# Patient Record
Sex: MALE
[De-identification: names, ages, dates, MRNs, and addresses within clinical notes are randomized per-mention and may not be internally consistent; named-entity substitution may affect disease eponyms.]

---

## 2019-11-17 ENCOUNTER — RESULT REVIEW (OUTPATIENT)
Age: 59
End: 2019-11-17

## 2019-11-18 ENCOUNTER — APPOINTMENT (OUTPATIENT)
Dept: DERMATOLOGY | Facility: CLINIC | Age: 59
End: 2019-11-18
Payer: COMMERCIAL

## 2019-11-18 PROCEDURE — 17281 DSTR MAL LS F/E/E/N/L/M .6-1: CPT

## 2019-11-18 PROCEDURE — 99202 OFFICE O/P NEW SF 15 MIN: CPT | Mod: 25

## 2019-11-19 PROBLEM — Z00.00 ENCOUNTER FOR PREVENTIVE HEALTH EXAMINATION: Status: ACTIVE | Noted: 2019-11-19

## 2019-11-21 ENCOUNTER — APPOINTMENT (OUTPATIENT)
Dept: SURGICAL ONCOLOGY | Facility: CLINIC | Age: 59
End: 2019-11-21
Payer: COMMERCIAL

## 2019-11-21 VITALS
SYSTOLIC BLOOD PRESSURE: 158 MMHG | WEIGHT: 230 LBS | RESPIRATION RATE: 14 BRPM | DIASTOLIC BLOOD PRESSURE: 97 MMHG | BODY MASS INDEX: 34.86 KG/M2 | HEIGHT: 68 IN | TEMPERATURE: 98.3 F | OXYGEN SATURATION: 95 % | HEART RATE: 102 BPM

## 2019-11-21 DIAGNOSIS — Z80.42 FAMILY HISTORY OF MALIGNANT NEOPLASM OF PROSTATE: ICD-10-CM

## 2019-11-21 DIAGNOSIS — E11.9 TYPE 2 DIABETES MELLITUS W/OUT COMPLICATIONS: ICD-10-CM

## 2019-11-21 DIAGNOSIS — E78.5 HYPERLIPIDEMIA, UNSPECIFIED: ICD-10-CM

## 2019-11-21 DIAGNOSIS — Z79.4 TYPE 2 DIABETES MELLITUS W/OUT COMPLICATIONS: ICD-10-CM

## 2019-11-21 DIAGNOSIS — I10 ESSENTIAL (PRIMARY) HYPERTENSION: ICD-10-CM

## 2019-11-21 DIAGNOSIS — Z87.448 PERSONAL HISTORY OF OTHER DISEASES OF URINARY SYSTEM: ICD-10-CM

## 2019-11-21 DIAGNOSIS — Z87.442 PERSONAL HISTORY OF URINARY CALCULI: ICD-10-CM

## 2019-11-21 DIAGNOSIS — Z78.9 OTHER SPECIFIED HEALTH STATUS: ICD-10-CM

## 2019-11-21 DIAGNOSIS — Z80.3 FAMILY HISTORY OF MALIGNANT NEOPLASM OF BREAST: ICD-10-CM

## 2019-11-21 DIAGNOSIS — Z80.8 FAMILY HISTORY OF MALIGNANT NEOPLASM OF OTHER ORGANS OR SYSTEMS: ICD-10-CM

## 2019-11-21 PROCEDURE — 99204 OFFICE O/P NEW MOD 45 MIN: CPT

## 2019-11-21 RX ORDER — AMLODIPINE BESYLATE 5 MG/1
5 TABLET ORAL
Refills: 0 | Status: ACTIVE | COMMUNITY

## 2019-11-21 RX ORDER — INSULIN DETEMIR 100 [IU]/ML
100 INJECTION, SOLUTION SUBCUTANEOUS
Refills: 0 | Status: ACTIVE | COMMUNITY

## 2019-11-21 RX ORDER — ESCITALOPRAM OXALATE 10 MG/1
10 TABLET, FILM COATED ORAL
Refills: 0 | Status: ACTIVE | COMMUNITY

## 2019-11-21 RX ORDER — ATORVASTATIN CALCIUM 80 MG/1
TABLET, FILM COATED ORAL
Refills: 0 | Status: ACTIVE | COMMUNITY

## 2019-11-21 RX ORDER — LOSARTAN POTASSIUM 50 MG/1
50 TABLET, FILM COATED ORAL
Refills: 0 | Status: ACTIVE | COMMUNITY

## 2019-11-21 RX ORDER — ALLOPURINOL 300 MG/1
300 TABLET ORAL
Refills: 0 | Status: ACTIVE | COMMUNITY

## 2019-11-21 RX ORDER — FOLIC ACID 1 MG/1
1 TABLET ORAL
Refills: 0 | Status: ACTIVE | COMMUNITY

## 2019-11-27 ENCOUNTER — OUTPATIENT (OUTPATIENT)
Dept: OUTPATIENT SERVICES | Facility: HOSPITAL | Age: 59
LOS: 1 days | End: 2019-11-27
Payer: MEDICARE

## 2019-11-27 DIAGNOSIS — C43.9 MALIGNANT MELANOMA OF SKIN, UNSPECIFIED: ICD-10-CM

## 2019-11-29 ENCOUNTER — RESULT REVIEW (OUTPATIENT)
Age: 59
End: 2019-11-29

## 2019-11-29 PROCEDURE — 88321 CONSLTJ&REPRT SLD PREP ELSWR: CPT

## 2019-12-02 NOTE — CONSULT LETTER
[Dear  ___] : Dear  [unfilled], [Consult Letter:] : I had the pleasure of evaluating your patient, [unfilled]. [Please see my note below.] : Please see my note below. [Consult Closing:] : Thank you very much for allowing me to participate in the care of this patient.  If you have any questions, please do not hesitate to contact me. [Sincerely,] : Sincerely, [FreeTextEntry3] : Aidan Vera MD, MPH, FACS\par Surgical Oncology\par [facility] [place]\par Associate Professor of Surgery\par Department of General Surgery\par [employer] at [facility]

## 2019-12-02 NOTE — PHYSICAL EXAM
[Normal] : supple, no neck mass and thyroid not enlarged [Normal Neck Lymph Nodes] : normal neck lymph nodes  [Normal Supraclavicular Lymph Nodes] : normal supraclavicular lymph nodes [Normal Axillary Lymph Nodes] : normal axillary lymph nodes [Normal] : oriented to person, place and time, with appropriate affect [de-identified] : left posterior shoulder dark brown/black macule with healing shave biopsy site ; lower back atypical nevus

## 2019-12-02 NOTE — HISTORY OF PRESENT ILLNESS
[de-identified] : Mr. JOE RAMIREZ  is a 59 year  old male  presenting for an initial consultation for newly diagnosed left upper shoulder melanoma, referred by Dr. Suresh. \par \par The patient states he developed an enlarging dark brown patch on his left upper shoulder over the past year and he sought care with dermatology.  He also had a left forehead BCC excised a few weeks ago.   \par \par Left upper shoulder shave biopsy 11/8/19- MELANOMA, approximately 0.4 mm in thickness, with focal dermal regression, mitotic index is 0/mm2, no ulceration. Tumor stage: pT1a\par \par Mr. RAMIREZ reports a longstanding history of sun exposure without the use of sunblock. History of sunburns. He also has a concerning mole on his lower back which has not yet been biopsied. \par \par PMH notable for HTN, HLD, Kidney stones resulting in obstruction and kidney failure s/p lithotripsy (last episode in 2014), IDDM, Depression/anxiety.  \par \par Family history includes mother with breast cancer, father with prostate cancer and brother with a melanoma.

## 2019-12-02 NOTE — REVIEW OF SYSTEMS
[Patient Intake Form Reviewed] : Patient intake form was reviewed [Negative] : Heme/Lymph [de-identified] : see HPI

## 2019-12-02 NOTE — ASSESSMENT
[FreeTextEntry1] : IMP:\par -Left upper shoulder shave biopsy 11/8/19- MELANOMA, approximately 0.4 mm in thickness, with focal dermal regression, mitotic index is 0/mm2, no ulceration. Tumor stage: pT1a\par -Lower back atypical nevus\par \par PLAN:\par Wide local excision of left upper shoulder T1 melanoma and lower back atypical nevus. We discussed the risks, benefits, and alternatives of the procedure with the patient, he expressed understanding and agree to proceed.

## 2019-12-11 ENCOUNTER — OUTPATIENT (OUTPATIENT)
Dept: OUTPATIENT SERVICES | Facility: HOSPITAL | Age: 59
LOS: 1 days | End: 2019-12-11
Payer: COMMERCIAL

## 2019-12-11 VITALS
TEMPERATURE: 98 F | SYSTOLIC BLOOD PRESSURE: 150 MMHG | DIASTOLIC BLOOD PRESSURE: 98 MMHG | RESPIRATION RATE: 18 BRPM | HEIGHT: 67 IN | WEIGHT: 233.69 LBS | HEART RATE: 98 BPM

## 2019-12-11 DIAGNOSIS — Z29.9 ENCOUNTER FOR PROPHYLACTIC MEASURES, UNSPECIFIED: ICD-10-CM

## 2019-12-11 DIAGNOSIS — Z98.890 OTHER SPECIFIED POSTPROCEDURAL STATES: Chronic | ICD-10-CM

## 2019-12-11 DIAGNOSIS — Z01.818 ENCOUNTER FOR OTHER PREPROCEDURAL EXAMINATION: ICD-10-CM

## 2019-12-11 DIAGNOSIS — C43.62 MALIGNANT MELANOMA OF LEFT UPPER LIMB, INCLUDING SHOULDER: ICD-10-CM

## 2019-12-11 DIAGNOSIS — R94.31 ABNORMAL ELECTROCARDIOGRAM [ECG] [EKG]: ICD-10-CM

## 2019-12-11 DIAGNOSIS — E11.9 TYPE 2 DIABETES MELLITUS WITHOUT COMPLICATIONS: ICD-10-CM

## 2019-12-11 LAB
ANION GAP SERPL CALC-SCNC: 14 MMOL/L — SIGNIFICANT CHANGE UP (ref 5–17)
BASOPHILS # BLD AUTO: 0.05 K/UL — SIGNIFICANT CHANGE UP (ref 0–0.2)
BASOPHILS NFR BLD AUTO: 0.5 % — SIGNIFICANT CHANGE UP (ref 0–2)
BLD GP AB SCN SERPL QL: SIGNIFICANT CHANGE UP
BUN SERPL-MCNC: 27 MG/DL — HIGH (ref 8–20)
CALCIUM SERPL-MCNC: 10.3 MG/DL — HIGH (ref 8.6–10.2)
CHLORIDE SERPL-SCNC: 93 MMOL/L — LOW (ref 98–107)
CO2 SERPL-SCNC: 27 MMOL/L — SIGNIFICANT CHANGE UP (ref 22–29)
CREAT SERPL-MCNC: 1.13 MG/DL — SIGNIFICANT CHANGE UP (ref 0.5–1.3)
EOSINOPHIL # BLD AUTO: 0.16 K/UL — SIGNIFICANT CHANGE UP (ref 0–0.5)
EOSINOPHIL NFR BLD AUTO: 1.7 % — SIGNIFICANT CHANGE UP (ref 0–6)
GLUCOSE SERPL-MCNC: 452 MG/DL — HIGH (ref 70–115)
HBA1C BLD-MCNC: 12.5 % — HIGH (ref 4–5.6)
HCT VFR BLD CALC: 46.8 % — SIGNIFICANT CHANGE UP (ref 39–50)
HGB BLD-MCNC: 16.2 G/DL — SIGNIFICANT CHANGE UP (ref 13–17)
IMM GRANULOCYTES NFR BLD AUTO: 0.6 % — SIGNIFICANT CHANGE UP (ref 0–1.5)
LYMPHOCYTES # BLD AUTO: 2.13 K/UL — SIGNIFICANT CHANGE UP (ref 1–3.3)
LYMPHOCYTES # BLD AUTO: 22.3 % — SIGNIFICANT CHANGE UP (ref 13–44)
MCHC RBC-ENTMCNC: 29.6 PG — SIGNIFICANT CHANGE UP (ref 27–34)
MCHC RBC-ENTMCNC: 34.6 GM/DL — SIGNIFICANT CHANGE UP (ref 32–36)
MCV RBC AUTO: 85.4 FL — SIGNIFICANT CHANGE UP (ref 80–100)
MONOCYTES # BLD AUTO: 0.58 K/UL — SIGNIFICANT CHANGE UP (ref 0–0.9)
MONOCYTES NFR BLD AUTO: 6.1 % — SIGNIFICANT CHANGE UP (ref 2–14)
NEUTROPHILS # BLD AUTO: 6.57 K/UL — SIGNIFICANT CHANGE UP (ref 1.8–7.4)
NEUTROPHILS NFR BLD AUTO: 68.8 % — SIGNIFICANT CHANGE UP (ref 43–77)
PLATELET # BLD AUTO: 237 K/UL — SIGNIFICANT CHANGE UP (ref 150–400)
POTASSIUM SERPL-MCNC: 4.8 MMOL/L — SIGNIFICANT CHANGE UP (ref 3.5–5.3)
POTASSIUM SERPL-SCNC: 4.8 MMOL/L — SIGNIFICANT CHANGE UP (ref 3.5–5.3)
RBC # BLD: 5.48 M/UL — SIGNIFICANT CHANGE UP (ref 4.2–5.8)
RBC # FLD: 13 % — SIGNIFICANT CHANGE UP (ref 10.3–14.5)
SODIUM SERPL-SCNC: 134 MMOL/L — LOW (ref 135–145)
WBC # BLD: 9.55 K/UL — SIGNIFICANT CHANGE UP (ref 3.8–10.5)
WBC # FLD AUTO: 9.55 K/UL — SIGNIFICANT CHANGE UP (ref 3.8–10.5)

## 2019-12-11 PROCEDURE — 93010 ELECTROCARDIOGRAM REPORT: CPT

## 2019-12-11 PROCEDURE — 93005 ELECTROCARDIOGRAM TRACING: CPT

## 2019-12-11 PROCEDURE — G0463: CPT

## 2019-12-11 NOTE — H&P PST ADULT - LAB RESULTS AND INTERPRETATION
Lab reviewed, Copies faxed to PCP and Dr. Vera office. Spoke to Angelica in PCP and  Baudilio in Dr Vera office regarding abnormal results.

## 2019-12-11 NOTE — H&P PST ADULT - NSICDXPASTMEDICALHX_GEN_ALL_CORE_FT
PAST MEDICAL HISTORY:  Diabetes mellitus     Hyperlipidemia     Hypertension     Malignant melanoma of left upper extremity including shoulder     ILIA on CPAP

## 2019-12-11 NOTE — H&P PST ADULT - EKG AND INTERPRETATION
normal sinus 97 bpm, right BBB and left anterior fascicular block, no acute change. EKG pending official reading

## 2019-12-11 NOTE — H&P PST ADULT - NSICDXFAMILYHX_GEN_ALL_CORE_FT
FAMILY HISTORY:  Father  Still living? No  FH: lung cancer, Age at diagnosis: Age Unknown    Mother  Still living? Yes, Estimated age: Age Unknown  FHx: breast cancer, Age at diagnosis: Age Unknown

## 2019-12-11 NOTE — H&P PST ADULT - NSICDXPROBLEM_GEN_ALL_CORE_FT
PROBLEM DIAGNOSES  Problem: Malignant melanoma of skin of upper limb, including shoulder, left  Assessment and Plan: wide excision left upper back malanoma and lower mid back atypical     Problem: Type 2 diabetes mellitus  Assessment and Plan: f/u with PCP    Problem: Abnormal EKG  Assessment and Plan: f/u with cardiologist     Problem: Need for prophylactic measure  Assessment and Plan: high risk surgical team to determine prophylactic intervention

## 2019-12-11 NOTE — H&P PST ADULT - HISTORY OF PRESENT ILLNESS
60 y/o male DM type 2, HTN, HLD, ILIA( on CPAP Machine), depression and anxiety seen today pre-op for wide excision left upper back melanoma and lower mid back atypical mole for malignant melanoma of left upper back and shoulder 60 y/o male DM type 2, HTN, HLD, ILIA( on CPAP Machine), depression and anxiety seen today pre-op for wide excision left upper back melanoma and lower mid back atypical mole for malignant melanoma of left upper back and shoulder, Pt seen for a scheduled surgery with Dr. Vera.

## 2019-12-11 NOTE — H&P PST ADULT - ATTENDING COMMENTS
Risks, benefits, and alternatives discussed with the patient he expressed understanding and agrees to proceed with wide local excision of left upper back melanoma and lower mid back atypical mole.

## 2019-12-11 NOTE — H&P PST ADULT - ASSESSMENT
58 y/o male DM type 2, HTN, HLD, ILIA( on CPAP Machine), depression and anxiety seen today pre-op for wide excision left upper back melanoma and lower mid back atypical mole for malignant melanoma of left upper back and shoulder. Surgery protocol reviewed with pt today. Pt to follow-up with PCP and cardiologist for clearances. Pt accompanied to this visit by his son   CAPRINI VTE 2.0 SCORE [CLOT updated 2019]    AGE RELATED RISK FACTORS                                                       MOBILITY RELATED FACTORS  [ x] Age 41-60 years                                            (1 Point)                    [ ] Bed rest                                                        (1 Point)  [ ] Age: 61-74 years                                           (2 Points)                  [ ] Plaster cast                                                   (2 Points)  [ ] Age= 75 years                                              (3 Points)                    [ ] Bed bound for more than 72 hours                 (2 Points)    DISEASE RELATED RISK FACTORS                                               GENDER SPECIFIC FACTORS  [ ] Edema in the lower extremities                       (1 Point)              [ ] Pregnancy                                                     (1 Point)  [ ] Varicose veins                                               (1 Point)                     [ ] Post-partum < 6 weeks                                   (1 Point)             [ x] BMI > 25 Kg/m2                                            (1 Point)                     [ ] Hormonal therapy  or oral contraception          (1 Point)                 [ ] Sepsis (in the previous month)                        (1 Point)               [ ] History of pregnancy complications                 (1 point)  [ ] Pneumonia or serious lung disease                                               [ ] Unexplained or recurrent                     (1 Point)           (in the previous month)                               (1 Point)  [ ] Abnormal pulmonary function test                     (1 Point)                 SURGERY RELATED RISK FACTORS  [ ] Acute myocardial infarction                              (1 Point)               [ ]  Section                                             (1 Point)  [ ] Congestive heart failure (in the previous month)  (1 Point)      [ ] Minor surgery                                                  (1 Point)   [ ] Inflammatory bowel disease                             (1 Point)               [ ] Arthroscopic surgery                                        (2 Points)  [ ] Central venous access                                      (2 Points)                [x ] General surgery lasting more than 45 minutes (2 points)  [x ] Malignancy- Present or previous                   (2 Points)                [ ] Elective arthroplasty                                         (5 points)    [ ] Stroke (in the previous month)                          (5 Points)                                                                                                                                                           HEMATOLOGY RELATED FACTORS                                                 TRAUMA RELATED RISK FACTORS  [ ] Prior episodes of VTE                                     (3 Points)                [ ] Fracture of the hip, pelvis, or leg                       (5 Points)  [ ] Positive family history for VTE                         (3 Points)             [ ] Acute spinal cord injury (in the previous month)  (5 Points)  [ ] Prothrombin 79276 A                                     (3 Points)               [ ] Paralysis  (less than 1 month)                             (5 Points)  [ ] Factor V Leiden                                             (3 Points)                  [ ] Multiple Trauma within 1 month                        (5 Points)  [ ] Lupus anticoagulants                                     (3 Points)                                                           [ ] Anticardiolipin antibodies                               (3 Points)                                                       [ ] High homocysteine in the blood                      (3 Points)                                             [ ] Other congenital or acquired thrombophilia      (3 Points)                                                [ ] Heparin induced thrombocytopenia                  (3 Points)                                     Total Score [     6     ]  OPIOID RISK TOOL    MOISÉS EACH BOX THAT APPLIES AND ADD TOTALS AT THE END    FAMILY HISTORY OF SUBSTANCE ABUSE                 FEMALE         MALE                                                Alcohol                             [  ]1 pt          [  ]3pts                                               Illegal Durgs                     [  ]2 pts        [  ]3pts                                               Rx Drugs                           [  ]4 pts        [  ]4 pts    PERSONAL HISTORY OF SUBSTANCE ABUSE                                                                                          Alcohol                             [  ]3 pts       [  ]3 pts                                               Illegal Drugs                     [  ]4 pts        [  ]4 pts                                               Rx Drugs                           [  ]5 pts        [  ]5 pts    AGE BETWEEN 16-45 YEARS                                      [  ]1 pt         [  ]1 pt    HISTORY OF PREADOLESCENT   SEXUAL ABUSE                                                             [  ]3 pts        [  ]0pts    PSYCHOLOGICAL DISEASE                     ADD, OCD, Bipolar, Schizophrenia        [  ]2 pts         [  ]2 pts                      Depression                                               [x  ]1 pt           [  ]1 pt           SCORING TOTAL   (add numbers and type here)              (*1**)                                     A score of 3 or lower indicated LOW risk for future opioid abuse  A score of 4 to 7 indicated moderate risk for future opioid abuse  A score of 8 or higher indicates a high risk for opioid abuse

## 2019-12-15 ENCOUNTER — TRANSCRIPTION ENCOUNTER (OUTPATIENT)
Age: 59
End: 2019-12-15

## 2019-12-16 ENCOUNTER — OUTPATIENT (OUTPATIENT)
Dept: OUTPATIENT SERVICES | Facility: HOSPITAL | Age: 59
LOS: 1 days | End: 2019-12-16
Payer: COMMERCIAL

## 2019-12-16 ENCOUNTER — RESULT REVIEW (OUTPATIENT)
Age: 59
End: 2019-12-16

## 2019-12-16 ENCOUNTER — APPOINTMENT (OUTPATIENT)
Dept: SURGICAL ONCOLOGY | Facility: HOSPITAL | Age: 59
End: 2019-12-16

## 2019-12-16 VITALS
RESPIRATION RATE: 16 BRPM | DIASTOLIC BLOOD PRESSURE: 84 MMHG | HEIGHT: 67 IN | TEMPERATURE: 98 F | OXYGEN SATURATION: 97 % | WEIGHT: 233.69 LBS | HEART RATE: 82 BPM | SYSTOLIC BLOOD PRESSURE: 151 MMHG

## 2019-12-16 VITALS
HEART RATE: 85 BPM | DIASTOLIC BLOOD PRESSURE: 68 MMHG | RESPIRATION RATE: 15 BRPM | TEMPERATURE: 98 F | OXYGEN SATURATION: 100 % | SYSTOLIC BLOOD PRESSURE: 111 MMHG

## 2019-12-16 DIAGNOSIS — C43.62 MALIGNANT MELANOMA OF LEFT UPPER LIMB, INCLUDING SHOULDER: ICD-10-CM

## 2019-12-16 DIAGNOSIS — Z98.890 OTHER SPECIFIED POSTPROCEDURAL STATES: Chronic | ICD-10-CM

## 2019-12-16 DIAGNOSIS — D22.5 MELANOCYTIC NEVI OF TRUNK: ICD-10-CM

## 2019-12-16 PROBLEM — I10 ESSENTIAL (PRIMARY) HYPERTENSION: Chronic | Status: ACTIVE | Noted: 2019-12-11

## 2019-12-16 PROBLEM — G47.33 OBSTRUCTIVE SLEEP APNEA (ADULT) (PEDIATRIC): Chronic | Status: ACTIVE | Noted: 2019-12-11

## 2019-12-16 PROBLEM — E78.5 HYPERLIPIDEMIA, UNSPECIFIED: Chronic | Status: ACTIVE | Noted: 2019-12-11

## 2019-12-16 PROBLEM — E11.9 TYPE 2 DIABETES MELLITUS WITHOUT COMPLICATIONS: Chronic | Status: ACTIVE | Noted: 2019-12-11

## 2019-12-16 LAB
GLUCOSE BLDC GLUCOMTR-MCNC: 254 MG/DL — HIGH (ref 70–99)
GLUCOSE BLDC GLUCOMTR-MCNC: 259 MG/DL — HIGH (ref 70–99)
GLUCOSE BLDC GLUCOMTR-MCNC: 326 MG/DL — HIGH (ref 70–99)

## 2019-12-16 PROCEDURE — 13102 CMPLX RPR TRUNK ADDL 5CM/<: CPT

## 2019-12-16 PROCEDURE — 82962 GLUCOSE BLOOD TEST: CPT

## 2019-12-16 PROCEDURE — 11403 EXC TR-EXT B9+MARG 2.1-3CM: CPT

## 2019-12-16 PROCEDURE — 13101 CMPLX RPR TRUNK 2.6-7.5 CM: CPT

## 2019-12-16 PROCEDURE — 11604 EXC TR-EXT MAL+MARG 3.1-4 CM: CPT

## 2019-12-16 PROCEDURE — 88305 TISSUE EXAM BY PATHOLOGIST: CPT

## 2019-12-16 PROCEDURE — 88305 TISSUE EXAM BY PATHOLOGIST: CPT | Mod: 26

## 2019-12-16 RX ORDER — ESCITALOPRAM OXALATE 10 MG/1
1 TABLET, FILM COATED ORAL
Qty: 0 | Refills: 0 | DISCHARGE

## 2019-12-16 RX ORDER — FOLIC ACID 0.8 MG
1 TABLET ORAL
Qty: 0 | Refills: 0 | DISCHARGE

## 2019-12-16 RX ORDER — ALLOPURINOL 300 MG
1 TABLET ORAL
Qty: 0 | Refills: 0 | DISCHARGE

## 2019-12-16 RX ORDER — LOSARTAN POTASSIUM 100 MG/1
1 TABLET, FILM COATED ORAL
Qty: 0 | Refills: 0 | DISCHARGE

## 2019-12-16 RX ORDER — ATORVASTATIN CALCIUM 80 MG/1
1 TABLET, FILM COATED ORAL
Qty: 0 | Refills: 0 | DISCHARGE

## 2019-12-16 RX ORDER — SODIUM CHLORIDE 9 MG/ML
1000 INJECTION INTRAMUSCULAR; INTRAVENOUS; SUBCUTANEOUS
Refills: 0 | Status: DISCONTINUED | OUTPATIENT
Start: 2019-12-16 | End: 2019-12-16

## 2019-12-16 RX ORDER — INSULIN DETEMIR 100/ML (3)
0 INSULIN PEN (ML) SUBCUTANEOUS
Qty: 0 | Refills: 0 | DISCHARGE

## 2019-12-16 RX ORDER — ONDANSETRON 8 MG/1
4 TABLET, FILM COATED ORAL ONCE
Refills: 0 | Status: DISCONTINUED | OUTPATIENT
Start: 2019-12-16 | End: 2019-12-16

## 2019-12-16 RX ORDER — INSULIN HUMAN 100 [IU]/ML
10 INJECTION, SOLUTION SUBCUTANEOUS ONCE
Refills: 0 | Status: DISCONTINUED | OUTPATIENT
Start: 2019-12-16 | End: 2020-01-09

## 2019-12-16 RX ORDER — ACETAMINOPHEN 500 MG
1000 TABLET ORAL ONCE
Refills: 0 | Status: DISCONTINUED | OUTPATIENT
Start: 2019-12-16 | End: 2019-12-16

## 2019-12-16 RX ORDER — AMLODIPINE BESYLATE 2.5 MG/1
1 TABLET ORAL
Qty: 0 | Refills: 0 | DISCHARGE

## 2019-12-16 NOTE — BRIEF OPERATIVE NOTE - OPERATION/FINDINGS
complex repair lower back lesion 5cm, complex repair left posterior shoulder 6.5cm, excision of benign dog ear 2x1.2cm x2

## 2019-12-16 NOTE — BRIEF OPERATIVE NOTE - OPERATION/FINDINGS
Excision of atypical mole of central lower back (short stitch marks 9 o'clock, long stitch marks 12 o'clock) with complex plastic closure.   Excision of melanoma of left posterior shoulder (short stitch marks superior, long stitch marks lateral) with complex plastic closure after excision of benign dog ears (inferior and superior dog ears sent as inferior margin and superior margin).

## 2019-12-16 NOTE — ASU DISCHARGE PLAN (ADULT/PEDIATRIC) - CALL YOUR DOCTOR IF YOU HAVE ANY OF THE FOLLOWING:
Bleeding that does not stop/Fever greater than (need to indicate Fahrenheit or Celsius) Pain not relieved by Medications/Swelling that gets worse/Nausea and vomiting that does not stop/Bleeding that does not stop/Fever greater than (need to indicate Fahrenheit or Celsius)/Wound/Surgical Site with redness, or foul smelling discharge or pus

## 2019-12-16 NOTE — ASU DISCHARGE PLAN (ADULT/PEDIATRIC) - CARE PROVIDER_API CALL
Aidan Vera)  Surgery; Surgical Oncology  [address] [place] [address]  Phone: [phone]  Fax: [phone]  Established Patient  Follow Up Time:

## 2019-12-16 NOTE — BRIEF OPERATIVE NOTE - SPECIMENS
atypical mole (cental back), melanoma (left posterior back), benign dog ears from posterior back wound (2 sent)

## 2019-12-16 NOTE — BRIEF OPERATIVE NOTE - NSICDXBRIEFPREOP_GEN_ALL_CORE_FT
PRE-OP DIAGNOSIS:  Melanoma of skin of trunk 16-Dec-2019 11:15:34  Sadi Parra A
PRE-OP DIAGNOSIS:  Melanoma of trunk 16-Dec-2019 11:23:18  Margie Dick

## 2020-01-02 ENCOUNTER — APPOINTMENT (OUTPATIENT)
Dept: SURGICAL ONCOLOGY | Facility: CLINIC | Age: 60
End: 2020-01-02
Payer: COMMERCIAL

## 2020-01-02 VITALS
WEIGHT: 230 LBS | TEMPERATURE: 97.5 F | OXYGEN SATURATION: 93 % | HEART RATE: 86 BPM | HEIGHT: 68 IN | BODY MASS INDEX: 34.86 KG/M2 | DIASTOLIC BLOOD PRESSURE: 84 MMHG | SYSTOLIC BLOOD PRESSURE: 135 MMHG

## 2020-01-02 LAB — SURGICAL PATHOLOGY STUDY: SIGNIFICANT CHANGE UP

## 2020-01-02 PROCEDURE — 99024 POSTOP FOLLOW-UP VISIT: CPT

## 2020-01-02 NOTE — ASSESSMENT
[FreeTextEntry1] : IMP:\par -Left upper shoulder shave biopsy 11/8/19- MELANOMA, approximately 0.4 mm in thickness, with focal dermal regression, mitotic index is 0/mm2, no ulceration. Tumor stage: pT1a\par -Lower back atypical nevus\par ***SURGERY 12/16/19-  S/p WLE of left upper back T1 melanoma and lower back atypical melanocytic nevus.  Closure by Dr. Parra. \par ***FINAL PATH-  pending\par \par 1/2/2020-  Denies pain, fever or chills. Reports redness around the left upper back incision.  He states he was discharged home on Bactrim however developed a pruritic rash on his back and was told to stop the antibiotic.  The rash has since resolved.  \par \par PLAN:\par 1) No antibiotic at this time\par 2) F/u with Dr. Parra\par 3) Will call patient with final pathology results\par 4) RTO 3 months

## 2020-01-02 NOTE — HISTORY OF PRESENT ILLNESS
[de-identified] : Mr. JOE RAMIREZ  is a 59 year  old male  presenting for an initial post op visit. \par Initially consulted 11/21/19 for newly diagnosed left upper shoulder melanoma, referred by Dr. Suresh. \par \par The patient states he developed an enlarging dark brown patch on his left upper shoulder over the past year and he sought care with dermatology.  He also had a left forehead BCC excised a few weeks ago.   \par \par Left upper shoulder shave biopsy 11/8/19- MELANOMA, approximately 0.4 mm in thickness, with focal dermal regression, mitotic index is 0/mm2, no ulceration. Tumor stage: pT1a\par \par Mr. RAMIREZ reports a longstanding history of sun exposure without the use of sunblock. History of sunburns. He also has a concerning mole on his lower back which has not yet been biopsied. \par \par PMH notable for HTN, HLD, Kidney stones resulting in obstruction and kidney failure s/p lithotripsy (last episode in 2014), IDDM, Depression/anxiety.  \par \par Family history includes mother with breast cancer, father with prostate cancer and brother with a melanoma. \par \par INTERVAL HISTORY:\par ***SURGERY 12/16/19-  S/p WLE of left upper back T1 melanoma and lower back atypical melanocytic nevus.  Closure by Dr. Parra. \par ***FINAL PATH-  pending\par \par 1/2/2020-  Denies pain, fever or chills. Reports redness around the left upper back incision.  He states he was discharged home on Bactrim however developed a pruritic rash on his back and was told to stop the antibiotic.  The rash has since resolved.

## 2020-01-02 NOTE — CONSULT LETTER
[Dear  ___] : Dear  [unfilled], [Please see my note below.] : Please see my note below. [Consult Letter:] : I had the pleasure of evaluating your patient, [unfilled]. [Consult Closing:] : Thank you very much for allowing me to participate in the care of this patient.  If you have any questions, please do not hesitate to contact me. [Sincerely,] : Sincerely, [FreeTextEntry3] : Aidan Vera MD, MPH, FACS\par Surgical Oncology\par [facility] [place]\par Associate Professor of Surgery\par Department of General Surgery\par [employer] at [facility]

## 2020-01-02 NOTE — PHYSICAL EXAM
[Normal] : well developed, well nourished, in no acute distress [de-identified] : healing lower back incision with no evidence of infection;  healing left upper back incision with mild blanchable erythema

## 2020-01-09 ENCOUNTER — APPOINTMENT (OUTPATIENT)
Dept: DERMATOLOGY | Facility: CLINIC | Age: 60
End: 2020-01-09
Payer: COMMERCIAL

## 2020-01-09 DIAGNOSIS — C44.319 BASAL CELL CARCINOMA OF SKIN OF OTHER PARTS OF FACE: ICD-10-CM

## 2020-01-09 PROCEDURE — 17311 MOHS 1 STAGE H/N/HF/G: CPT

## 2020-01-09 PROCEDURE — 13132 CMPLX RPR F/C/C/M/N/AX/G/H/F: CPT

## 2020-03-17 ENCOUNTER — APPOINTMENT (OUTPATIENT)
Dept: DERMATOLOGY | Facility: CLINIC | Age: 60
End: 2020-03-17

## 2020-04-09 ENCOUNTER — APPOINTMENT (OUTPATIENT)
Dept: SURGICAL ONCOLOGY | Facility: CLINIC | Age: 60
End: 2020-04-09
Payer: COMMERCIAL

## 2020-04-09 VITALS
HEIGHT: 68 IN | DIASTOLIC BLOOD PRESSURE: 90 MMHG | RESPIRATION RATE: 16 BRPM | WEIGHT: 235 LBS | BODY MASS INDEX: 35.61 KG/M2 | HEART RATE: 83 BPM | OXYGEN SATURATION: 94 % | TEMPERATURE: 98.2 F | SYSTOLIC BLOOD PRESSURE: 152 MMHG

## 2020-04-09 DIAGNOSIS — T81.31XA DISRUPTION OF EXTERNAL OPERATION (SURGICAL) WOUND, NOT ELSEWHERE CLASSIFIED, INITIAL ENCOUNTER: ICD-10-CM

## 2020-04-09 PROCEDURE — 99214 OFFICE O/P EST MOD 30 MIN: CPT

## 2020-04-10 PROBLEM — T81.31XA POSTOPERATIVE WOUND DEHISCENCE, INITIAL ENCOUNTER: Status: ACTIVE | Noted: 2020-04-10

## 2020-04-10 NOTE — HISTORY OF PRESENT ILLNESS
[de-identified] : Mr. JOE RAMIREZ  is a 59 year  old male  presenting for a follow up visit. \par Initially consulted 11/21/19 for newly diagnosed left upper shoulder melanoma, referred by Dr. Suresh. \par \par The patient states he developed an enlarging dark brown patch on his left upper shoulder over the past year and he sought care with dermatology.  He also had a left forehead BCC excised a few weeks ago.   \par \par Left upper shoulder shave biopsy 11/8/19- MELANOMA, approximately 0.4 mm in thickness, with focal dermal regression, mitotic index is 0/mm2, no ulceration. Tumor stage: pT1a\par \par Mr. RAMIREZ reports a longstanding history of sun exposure without the use of sunblock. History of sunburns. He also has a concerning mole on his lower back which has not yet been biopsied. \par \par PMH notable for HTN, HLD, Kidney stones resulting in obstruction and kidney failure s/p lithotripsy (last episode in 2014), IDDM, Depression/anxiety.  \par \par Family history includes mother with breast cancer, father with prostate cancer and brother with a melanoma. \par \par INTERVAL HISTORY:\par ***SURGERY 12/16/19-  S/p WLE of left upper back T1 melanoma and lower back atypical melanocytic nevus.  Closure by Dr. Parra. \par ***FINAL PATH-  1) Lower mid back- Dysplastic melanocytic nevus, compound type, with moderate to severe atypia, negative margins.   2) Left upper back - Invasive melanoma, superficial spreading type, Breslow thickness approximately 0.7 mm, with prominent regression changes, negative margins.  \par \par 1/2/2020-  Denies pain, fever or chills. Reports redness around the left upper back incision.  He states he was discharged home on Bactrim however developed a pruritic rash on his back and was told to stop the antibiotic.  The rash has since resolved.  \par \par 4/9/2020- Seen by Dr. Suresh on 3/17/2020. Denies new skin lesions. No bleeding or pruritic moles.

## 2020-04-10 NOTE — ASSESSMENT
[FreeTextEntry1] : IMP:\par -Left upper shoulder shave biopsy 11/8/19- MELANOMA, approximately 0.4 mm in thickness, with focal dermal regression, mitotic index is 0/mm2, no ulceration. Tumor stage: pT1a\par -Lower back atypical nevus\par ***SURGERY 12/16/19-  S/p WLE of left upper back T1 melanoma and lower back atypical melanocytic nevus.  Closure by Dr. Parra. \par ***FINAL PATH-  ***FINAL PATH-  1) Lower mid back- Dysplastic melanocytic nevus, compound type, with moderate to severe atypia, negative margins.   2) Left upper back - Invasive melanoma, superficial spreading type, Breslow thickness approximately 0.7 mm, with prominent regression changes, negative margins.  \par No evidence of recurrence \par \par PLAN:\par 1) Continue f/u with dermatology\par 2) RTO 6 months

## 2020-04-10 NOTE — PHYSICAL EXAM
[Normal] : well developed, well nourished, in no acute distress [de-identified] : healed lower back incision; left upper back incision with small area of weakening in midpoint with expressing some clear fluid - no infection

## 2020-04-10 NOTE — CONSULT LETTER
[Dear  ___] : Dear  [unfilled], [Consult Letter:] : I had the pleasure of evaluating your patient, [unfilled]. [Please see my note below.] : Please see my note below. [Consult Closing:] : Thank you very much for allowing me to participate in the care of this patient.  If you have any questions, please do not hesitate to contact me. [Sincerely,] : Sincerely, [FreeTextEntry3] : Aidan Vera MD, MPH, FACS\par Surgical Oncology\par [facility] [place]\par Associate Professor of Surgery\par Department of General Surgery\par [employer] at [facility]

## 2020-07-09 ENCOUNTER — APPOINTMENT (OUTPATIENT)
Dept: SURGICAL ONCOLOGY | Facility: CLINIC | Age: 60
End: 2020-07-09
Payer: COMMERCIAL

## 2020-07-09 VITALS
WEIGHT: 235 LBS | HEART RATE: 94 BPM | TEMPERATURE: 98 F | OXYGEN SATURATION: 94 % | SYSTOLIC BLOOD PRESSURE: 138 MMHG | RESPIRATION RATE: 16 BRPM | DIASTOLIC BLOOD PRESSURE: 80 MMHG | HEIGHT: 68 IN | BODY MASS INDEX: 35.61 KG/M2

## 2020-07-09 PROCEDURE — 99214 OFFICE O/P EST MOD 30 MIN: CPT

## 2020-09-10 NOTE — CONSULT LETTER
[Consult Letter:] : I had the pleasure of evaluating your patient, [unfilled]. [Dear  ___] : Dear  [unfilled], [Please see my note below.] : Please see my note below. [Consult Closing:] : Thank you very much for allowing me to participate in the care of this patient.  If you have any questions, please do not hesitate to contact me. [Sincerely,] : Sincerely, [FreeTextEntry3] : Aidan Vera MD, MPH, FACS\par Surgical Oncology\par [facility] [place]\par Associate Professor of Surgery\par Department of General Surgery\par [employer] at [facility]

## 2020-09-10 NOTE — ASSESSMENT
[FreeTextEntry1] : IMP:\par -Left upper shoulder shave biopsy 11/8/19- MELANOMA, approximately 0.4 mm in thickness, with focal dermal regression, mitotic index is 0/mm2, no ulceration. Tumor stage: pT1a\par -Lower back atypical nevus\par ***SURGERY 12/16/19-  S/p WLE of left upper back T1 melanoma and lower back atypical melanocytic nevus.  Closure by Dr. Parra. \par ***FINAL PATH-  1) Lower mid back- Dysplastic melanocytic nevus, compound type, with moderate to severe atypia, negative margins.   2) Left upper back - Invasive melanoma, superficial spreading type, Breslow thickness approximately 0.7 mm, with prominent regression changes, negative margins.  \par \par Plan:\par 1) RTO in 3 months\par 2) Dermatology follow-up

## 2020-09-10 NOTE — HISTORY OF PRESENT ILLNESS
[de-identified] : Mr. JOE RAMIREZ  is a 59 year  old male presenting for a 3 month follow up visit. \par Initially consulted 11/21/19 for newly diagnosed left upper shoulder melanoma, referred by Dr. Suresh. \par \par The patient states he developed an enlarging dark brown patch on his left upper shoulder over the past year and he sought care with dermatology.  He also had a left forehead BCC excised. \par \par Left upper shoulder shave biopsy 11/8/19- MELANOMA, approximately 0.4 mm in thickness, with focal dermal regression, mitotic index is 0/mm2, no ulceration. Tumor stage: pT1a\par \par Mr. RAMIREZ reports a longstanding history of sun exposure without the use of sunblock. History of sunburns. He also has a concerning mole on his lower back which has not yet been biopsied. \par \par PMH notable for HTN, HLD, Kidney stones resulting in obstruction and kidney failure s/p lithotripsy (last episode in 2014), IDDM, Depression/anxiety.  \par \par Family history includes mother with breast cancer, father with prostate cancer and brother with a melanoma. \par \par INTERVAL HISTORY:\par ***SURGERY 12/16/19-  S/p WLE of left upper back T1 melanoma and lower back atypical melanocytic nevus.  Closure by Dr. Parra. \par ***FINAL PATH-  1) Lower mid back- Dysplastic melanocytic nevus, compound type, with moderate to severe atypia, negative margins.   2) Left upper back - Invasive melanoma, superficial spreading type, Breslow thickness approximately 0.7 mm, with prominent regression changes, negative margins.  \par \par 1/2/2020-  Denies pain, fever or chills. Reports redness around the left upper back incision.  He states he was discharged home on Bactrim however developed a pruritic rash on his back and was told to stop the antibiotic.  The rash has since resolved.  \par \par 4/9/2020- Seen by Dr. Suresh on 3/17/2020. Denies new skin lesions. No bleeding or pruritic moles. \par \par 7/9/20- Doing well.  No issues.

## 2020-09-10 NOTE — PHYSICAL EXAM
[Normal] : well developed, well nourished, in no acute distress [de-identified] : healed lower back incision and left upper back incisions

## 2021-01-07 ENCOUNTER — APPOINTMENT (OUTPATIENT)
Dept: SURGICAL ONCOLOGY | Facility: CLINIC | Age: 61
End: 2021-01-07

## 2021-08-13 ENCOUNTER — APPOINTMENT (OUTPATIENT)
Dept: SURGERY | Facility: CLINIC | Age: 61
End: 2021-08-13
Payer: COMMERCIAL

## 2021-08-13 VITALS — DIASTOLIC BLOOD PRESSURE: 86 MMHG | SYSTOLIC BLOOD PRESSURE: 138 MMHG

## 2021-08-13 VITALS
OXYGEN SATURATION: 97 % | SYSTOLIC BLOOD PRESSURE: 146 MMHG | HEART RATE: 83 BPM | WEIGHT: 226 LBS | HEIGHT: 67 IN | DIASTOLIC BLOOD PRESSURE: 90 MMHG | BODY MASS INDEX: 35.47 KG/M2 | TEMPERATURE: 97.2 F

## 2021-08-13 PROCEDURE — 99244 OFF/OP CNSLTJ NEW/EST MOD 40: CPT

## 2021-08-13 NOTE — HISTORY OF PRESENT ILLNESS
[de-identified] : The patient comes to the office in consultation by Dr. Branden Gr for evaluation of a bulge of the right flank and abdominal wall. The patient reports that he started to have severe pain in the right flank and his wife noted a large bulge.  He went to [facility] for evaluation where he was admitted for 4 days.  He was told it was a lipoma and to see a surgeon.  The patient is without nausea or vomit, he denies any BRBPR, denies any changes in his bowel function.  He has never had a colonoscopy.  He reports having an 8 hour robotic procedure to clear a stag horn calculus of the right kidney 6 years ago.  He denies any trauma to the area.

## 2021-08-13 NOTE — CONSULT LETTER
[Dear  ___] : Dear  [unfilled], [Consult Letter:] : I had the pleasure of evaluating your patient, [unfilled]. [Please see my note below.] : Please see my note below. [Consult Closing:] : Thank you very much for allowing me to participate in the care of this patient.  If you have any questions, please do not hesitate to contact me. [Sincerely,] : Sincerely, [FreeTextEntry3] : Rubio Beltran MD, FACS\par  \par Department of Surgery\par [facility]\par [facility]\par

## 2021-08-13 NOTE — PHYSICAL EXAM
[JVD] : no jugular venous distention  [No Rash or Lesion] : No rash or lesion [Purpura] : no purpura  [Petechiae] : no petechiae [Skin Ulcer] : no ulcer [Skin Induration] : no induration [Alert] : alert [Oriented to Person] : oriented to person [Oriented to Place] : oriented to place [Oriented to Time] : oriented to time [Anxious] : anxious [de-identified] : non toxic, in no acute distress  [de-identified] : NC/AT PERRL EOMI no scleral icterus  [de-identified] : trachea midline, no gross mass  [de-identified] : no audible wheezing or stridor  [de-identified] : protuberant, obese, no localizing tenderness, no guarding, no rebound  [de-identified] : FROM of the extremities, no gross deformity or angulation, there is an umbilical hernia, no ventral hernia, there is bulging of the right flank and lateral abdominal wall with no clear evidence of discrete mass or hernia  [de-identified] : mood is mildly anxious

## 2021-08-13 NOTE — ASSESSMENT
[FreeTextEntry1] : The patient is an obese 60 year old male with a bulge of the right lateral abdominal wall.  The patient was told at [facility] that this was a lipoma based on imaging.  If a lipoma, it is likely an intra-abdominal or retroperitoneal lipoma. I have recommended that the patient obtain the CT scan films from  for review as they are not present today.  Based on review of the imaging further recommendations will be made.  The patient was also advised about weight loss as this will be important to reduce the potential risk of hernia if surgery is needed.  The patient has been advised that weight loss will be an important adjunct to help potentially reduce the risks of infection, hernia recurrence, and chronic post operative pain associated with surgery by potentially reducing the tension along the abdominal wall. Regarding the umbilical hernia, it has been asymptomatic at this time and will be addressed if symptomatic, but weight loss will also be necessary for repair.  The patient will obtain a copy of the imaging and return in 2 weeks for review and reexamination.  A total of 60 minutes was spent coordinating the patient's care.

## 2021-08-30 ENCOUNTER — APPOINTMENT (OUTPATIENT)
Dept: SURGERY | Facility: CLINIC | Age: 61
End: 2021-08-30
Payer: COMMERCIAL

## 2021-08-30 VITALS
HEIGHT: 67 IN | SYSTOLIC BLOOD PRESSURE: 165 MMHG | OXYGEN SATURATION: 99 % | DIASTOLIC BLOOD PRESSURE: 99 MMHG | RESPIRATION RATE: 16 BRPM | WEIGHT: 225 LBS | BODY MASS INDEX: 35.31 KG/M2 | HEART RATE: 95 BPM | TEMPERATURE: 97.5 F

## 2021-08-30 VITALS — DIASTOLIC BLOOD PRESSURE: 100 MMHG | SYSTOLIC BLOOD PRESSURE: 157 MMHG

## 2021-08-30 DIAGNOSIS — R19.00 INTRA-ABDOMINAL AND PELVIC SWELLING, MASS AND LUMP, UNSPECIFIED SITE: ICD-10-CM

## 2021-08-30 DIAGNOSIS — E66.9 OBESITY, UNSPECIFIED: ICD-10-CM

## 2021-08-30 DIAGNOSIS — K42.9 UMBILICAL HERNIA W/OUT OBSTRUCTION OR GANGRENE: ICD-10-CM

## 2021-08-30 DIAGNOSIS — D17.9 BENIGN LIPOMATOUS NEOPLASM, UNSPECIFIED: ICD-10-CM

## 2021-08-30 PROCEDURE — 99214 OFFICE O/P EST MOD 30 MIN: CPT

## 2021-08-30 NOTE — DATA REVIEWED
[FreeTextEntry1] : Independent review of the abd/pel CT scan reveals a fat containing umbilical hernia, there is a 5 by 2.6 cm lipoma of the mesentry near the ileocecal valve with a very small degree of mass effect, there is significant bulging of the abdominal wall muscle with no evidence of herniation

## 2021-08-30 NOTE — ASSESSMENT
[FreeTextEntry1] : The patient is an obese 60 year old male with a stable umbilical hernia and bulging of the right lateral abdominal wall.  The patient in fact does have a lipoma of the mesentry near the ileocecal valve.  I have suggested he pursue a colonoscopy.  He will embark on weight loss and will follow up as needed from this point forward as we collectively agreed that surgery is likely to be of little benefit for the bulging.  He will be best served with weight reduction.  A total of 30 minutes was spent coordinating the patient's care.

## 2021-08-30 NOTE — PHYSICAL EXAM
[JVD] : no jugular venous distention  [No Rash or Lesion] : No rash or lesion [Purpura] : no purpura  [Petechiae] : no petechiae [Skin Ulcer] : no ulcer [Skin Induration] : no induration [Alert] : alert [Oriented to Person] : oriented to person [Oriented to Place] : oriented to place [Oriented to Time] : oriented to time [Anxious] : anxious [de-identified] : non toxic, in no acute distress  [de-identified] : NC/AT PERRL EOMI no scleral icterus  [de-identified] : trachea midline, no gross mass  [de-identified] : no audible wheezing or stridor  [de-identified] : remains protuberant, obese, no localizing tenderness, no guarding, no rebound  [de-identified] : FROM of the extremities, no gross deformity or angulation, there remains an umbilical hernia, no ventral hernia, there is bulging of the right flank and lateral abdominal wall with no clear evidence of discrete mass or hernia  [de-identified] : mood is mildly anxious

## 2021-08-30 NOTE — HISTORY OF PRESENT ILLNESS
[de-identified] : The patient comes to the office in follow up of a bulge and pain in the right abdominal area.  The patient at this point  has no nausea, no vomit, no changes in his bowel function. He states he has been trying to lose weight but has not lost any appreciable weight.

## 2021-10-19 ENCOUNTER — APPOINTMENT (OUTPATIENT)
Dept: DERMATOLOGY | Facility: CLINIC | Age: 61
End: 2021-10-19
Payer: COMMERCIAL

## 2021-10-19 PROCEDURE — 99213 OFFICE O/P EST LOW 20 MIN: CPT

## 2021-11-04 ENCOUNTER — APPOINTMENT (OUTPATIENT)
Dept: SURGICAL ONCOLOGY | Facility: CLINIC | Age: 61
End: 2021-11-04
Payer: COMMERCIAL

## 2021-11-04 VITALS
BODY MASS INDEX: 36.57 KG/M2 | TEMPERATURE: 97.5 F | RESPIRATION RATE: 16 BRPM | WEIGHT: 233 LBS | HEIGHT: 67 IN | HEART RATE: 98 BPM | SYSTOLIC BLOOD PRESSURE: 196 MMHG | OXYGEN SATURATION: 98 % | DIASTOLIC BLOOD PRESSURE: 112 MMHG

## 2021-11-04 VITALS — DIASTOLIC BLOOD PRESSURE: 91 MMHG | SYSTOLIC BLOOD PRESSURE: 167 MMHG

## 2021-11-04 PROCEDURE — 99214 OFFICE O/P EST MOD 30 MIN: CPT

## 2021-11-04 NOTE — CONSULT LETTER
[Dear  ___] : Dear  [unfilled], [Consult Letter:] : I had the pleasure of evaluating your patient, [unfilled]. [Please see my note below.] : Please see my note below. [Consult Closing:] : Thank you very much for allowing me to participate in the care of this patient.  If you have any questions, please do not hesitate to contact me. [Sincerely,] : Sincerely, [FreeTextEntry3] : Aidan Vera MD, MPH, FACS\par Surgical Oncology\par [facility] [place]\par Associate Professor of Surgery\par Department of General Surgery\par [employer] at [facility]

## 2021-11-04 NOTE — HISTORY OF PRESENT ILLNESS
[de-identified] : Mr. JOE RAMIREZ  is a 61 year  old male presenting for a follow up visit. \par Initially consulted 11/21/19 for newly diagnosed left upper shoulder melanoma, referred by Dr. Suresh. \par \par The patient states he developed an enlarging dark brown patch on his left upper shoulder over the past year and he sought care with dermatology.  He also had a left forehead BCC excised. \par \par Left upper shoulder shave biopsy 11/8/19- MELANOMA, approximately 0.4 mm in thickness, with focal dermal regression, mitotic index is 0/mm2, no ulceration. Tumor stage: pT1a\par \par Mr. RAMIREZ reports a longstanding history of sun exposure without the use of sunblock. History of sunburns. He also has a concerning mole on his lower back which has not yet been biopsied. \par \par PMH notable for HTN, HLD, Kidney stones resulting in obstruction and kidney failure s/p lithotripsy (last episode in 2014), IDDM, Depression/anxiety.  \par \par Family history includes mother with breast cancer, father with prostate cancer and brother with a melanoma. \par \par INTERVAL HISTORY:\par ***SURGERY 12/16/19-  S/p WLE of left upper back T1 melanoma and lower back atypical melanocytic nevus.  Closure by Dr. Parra. \par ***FINAL PATH-  1) Lower mid back- Dysplastic melanocytic nevus, compound type, with moderate to severe atypia, negative margins.   2) Left upper back - Invasive melanoma, superficial spreading type, Breslow thickness approximately 0.7 mm, with prominent regression changes, negative margins.  \par \par 1/2/2020-  Denies pain, fever or chills. Reports redness around the left upper back incision.  He states he was discharged home on Bactrim however developed a pruritic rash on his back and was told to stop the antibiotic.  The rash has since resolved.  \par \par 4/9/2020- Seen by Dr. Suresh on 3/17/2020. Denies new skin lesions. No bleeding or pruritic moles. \par \par 7/9/20- Doing well.  No issues.\par \par His Nephrologist ordered a PET/CT(whole body) 9/24/21: Mild activity corresponding to skin thickening in the posterior upper back at the level of cervical and upper thoracic spine and increased activity  at subcutaneous infiltration in the right anterolateral abdomen could be inflammatory in nature. However in light of patient's clinical history, correlate on dermatologic exam to exclude underlying pathology there. \par Otherwise no evidence of FDG avid malignant disease elsewhere in the field of view \par \par 11/4/21- He underwent a full body skin check with Dr. Suresh with no abnormal findings and no biopsies . He denies any concerning skin lesions or masses. No bleeding or pruritic moles. States his kidney disease is worsening however he is not yet on dialysis. States his nephrologist discontinued some blood pressure medications. His BP today was 196/112;  Repeat BP after 5 minutes was 167/91.  He will call his nephrologist and PCP to discuss todays BP.

## 2022-02-24 ENCOUNTER — APPOINTMENT (OUTPATIENT)
Dept: DERMATOLOGY | Facility: CLINIC | Age: 62
End: 2022-02-24

## 2022-05-05 ENCOUNTER — APPOINTMENT (OUTPATIENT)
Dept: SURGICAL ONCOLOGY | Facility: CLINIC | Age: 62
End: 2022-05-05
Payer: COMMERCIAL

## 2022-05-05 VITALS
DIASTOLIC BLOOD PRESSURE: 80 MMHG | BODY MASS INDEX: 36.49 KG/M2 | OXYGEN SATURATION: 95 % | WEIGHT: 232.5 LBS | SYSTOLIC BLOOD PRESSURE: 144 MMHG | HEART RATE: 71 BPM | TEMPERATURE: 97.4 F | HEIGHT: 67 IN

## 2022-05-05 DIAGNOSIS — D22.5 MELANOCYTIC NEVI OF TRUNK: ICD-10-CM

## 2022-05-05 PROCEDURE — 99213 OFFICE O/P EST LOW 20 MIN: CPT

## 2022-05-05 NOTE — HISTORY OF PRESENT ILLNESS
[de-identified] : Mr. JOE RAMIREZ  is a 61 year  old male presenting for a follow up visit. \par Initially consulted 11/21/19 for newly diagnosed left upper shoulder melanoma, referred by Dr. Suresh. \par \par The patient states he developed an enlarging dark brown patch on his left upper shoulder over the past year and he sought care with dermatology.  He also had a left forehead BCC excised. \par \par Left upper shoulder shave biopsy 11/8/19- MELANOMA, approximately 0.4 mm in thickness, with focal dermal regression, mitotic index is 0/mm2, no ulceration. Tumor stage: pT1a\par \par Mr. RAMIREZ reports a longstanding history of sun exposure without the use of sunblock. History of sunburns. He also has a concerning mole on his lower back which has not yet been biopsied. \par \par PMH notable for HTN, HLD, Kidney stones resulting in obstruction and kidney failure s/p lithotripsy (last episode in 2014), IDDM, Depression/anxiety.  \par \par Family history includes mother with breast cancer, father with prostate cancer and brother with a melanoma. \par \par INTERVAL HISTORY:\par ***SURGERY 12/16/19-  S/p WLE of left upper back T1 melanoma and lower back atypical melanocytic nevus.  Closure by Dr. Parra. \par ***FINAL PATH-  1) Lower mid back- Dysplastic melanocytic nevus, compound type, with moderate to severe atypia, negative margins.   2) Left upper back - Invasive melanoma, superficial spreading type, Breslow thickness approximately 0.7 mm, with prominent regression changes, negative margins.  \par \par 1/2/2020-  Denies pain, fever or chills. Reports redness around the left upper back incision.  He states he was discharged home on Bactrim however developed a pruritic rash on his back and was told to stop the antibiotic.  The rash has since resolved.  \par \par 4/9/2020- Seen by Dr. Suresh on 3/17/2020. Denies new skin lesions. No bleeding or pruritic moles. \par \par 7/9/20- Doing well.  No issues.\par \par His Nephrologist ordered a PET/CT(whole body) 9/24/21: Mild activity corresponding to skin thickening in the posterior upper back at the level of cervical and upper thoracic spine and increased activity  at subcutaneous infiltration in the right anterolateral abdomen could be inflammatory in nature. However in light of patient's clinical history, correlate on dermatologic exam to exclude underlying pathology there. \par Otherwise no evidence of FDG avid malignant disease elsewhere in the field of view \par \par 11/4/21- He underwent a full body skin check with Dr. Suresh with no abnormal findings and no biopsies . He denies any concerning skin lesions or masses. No bleeding or pruritic moles. States his kidney disease is worsening however he is not yet on dialysis. States his nephrologist discontinued some blood pressure medications. His BP today was 196/112;  Repeat BP after 5 minutes was 167/91.  He will call his nephrologist and PCP to discuss todays BP. \par \par 5/5/2022-  Pt. was seen by Dr. Suresh in 2/2022 with no new biopsies. Pt. states he is followed by a nephrologist and his kidney disease has been under control.  He is not on dialysis. Feeling well with no complaints.

## 2022-05-05 NOTE — CONSULT LETTER
[Dear  ___] : Dear  [unfilled], [Consult Letter:] : I had the pleasure of evaluating your patient, [unfilled]. [Please see my note below.] : Please see my note below. [Consult Closing:] : Thank you very much for allowing me to participate in the care of this patient.  If you have any questions, please do not hesitate to contact me. [Sincerely,] : Sincerely, [FreeTextEntry3] : Aidan Vera MD, MPH, FACS\par Surgical Oncology\par [facility]\par Associate Professor of Surgery\par Department of General Surgery\par [employer] at [facility]

## 2022-05-05 NOTE — ASSESSMENT
[FreeTextEntry1] : IMP:\par -Left upper shoulder shave biopsy 11/8/19- MELANOMA, approximately 0.4 mm in thickness, with focal dermal regression, mitotic index is 0/mm2, no ulceration. Tumor stage: pT1a\par -Lower back atypical nevus\par ***SURGERY 12/16/19-  S/p WLE of left upper back T1 melanoma and lower back atypical melanocytic nevus.  Closure by Dr. Parra. \par ***FINAL PATH-  1) Lower mid back- Dysplastic melanocytic nevus, compound type, with moderate to severe atypia, negative margins.   2) Left upper back - Invasive melanoma, superficial spreading type, Breslow thickness approximately 0.7 mm, with prominent regression changes, negative margins.  \par - No evidence of recurrence \par \par PET/CT(whole body) 9/24/21: Mild activity corresponding to skin thickening in the posterior upper back at the level of cervical and upper thoracic spine and increased activity  at subcutaneous infiltration in the right anterolateral abdomen could be inflammatory in nature. However in light of patient's clinical history, correlate on dermatologic exam to exclude underlying pathology there. \par Otherwise no evidence of FDG avid malignant disease elsewhere in the field of view \par \par No evidence of recurrence. Seen by Dr. Suresh 2/2022 with no new biopsies. \par \par Plan:\par 1) RTO 6 months\par 2) Dermatology follow-up for full body skin checks \par

## 2022-10-06 ENCOUNTER — APPOINTMENT (OUTPATIENT)
Age: 62
End: 2022-10-06

## 2022-10-06 VITALS
HEART RATE: 71 BPM | BODY MASS INDEX: 36.57 KG/M2 | OXYGEN SATURATION: 96 % | DIASTOLIC BLOOD PRESSURE: 80 MMHG | HEIGHT: 67 IN | SYSTOLIC BLOOD PRESSURE: 148 MMHG | TEMPERATURE: 97.4 F | RESPIRATION RATE: 16 BRPM | WEIGHT: 233 LBS

## 2022-10-06 PROCEDURE — 99213 OFFICE O/P EST LOW 20 MIN: CPT

## 2022-12-13 NOTE — PHYSICAL EXAM
[Normal] : supple, no neck mass and thyroid not enlarged [Normal Neck Lymph Nodes] : normal neck lymph nodes  [Normal Supraclavicular Lymph Nodes] : normal supraclavicular lymph nodes [Normal Axillary Lymph Nodes] : normal axillary lymph nodes [Normal] : oriented to person, place and time, with appropriate affect [de-identified] : healed lower back incision with no evidence of recurrence;  healed left upper back incision with no evidence of recurrence

## 2022-12-13 NOTE — ASSESSMENT
[FreeTextEntry1] : IMP:\par -Left upper shoulder shave biopsy 11/8/19- MELANOMA, approximately 0.4 mm in thickness, with focal dermal regression, mitotic index is 0/mm2, no ulceration. Tumor stage: pT1a\par -Lower back atypical nevus\par ***SURGERY 12/16/19-  S/p WLE of left upper back T1 melanoma and lower back atypical melanocytic nevus.  Closure by Dr. Parra. \par ***FINAL PATH-  1) Lower mid back- Dysplastic melanocytic nevus, compound type, with moderate to severe atypia, negative margins.   2) Left upper back - Invasive melanoma, superficial spreading type, Breslow thickness approximately 0.7 mm, with prominent regression changes, negative margins.  \par - No evidence of recurrence \par \par PET/CT(whole body) 9/24/21: Mild activity corresponding to skin thickening in the posterior upper back at the level of cervical and upper thoracic spine and increased activity  at subcutaneous infiltration in the right anterolateral abdomen could be inflammatory in nature. However in light of patient's clinical history, correlate on dermatologic exam to exclude underlying pathology there. \par Otherwise no evidence of FDG avid malignant disease elsewhere in the field of view \par \par No evidence of recurrence. Will schedule dermatology appointment for full body checks \par \par Plan:\par 1) Follow up yearly \par 2) Dermatology follow-up for full body skin checks \par

## 2022-12-13 NOTE — HISTORY OF PRESENT ILLNESS
[de-identified] : Mr. JOE RAMIREZ  is a 61 year old male presenting for a follow up visit. \par Initially consulted 11/21/19 for newly diagnosed left upper shoulder melanoma, referred by Dr. Suresh. \par \par The patient states he developed an enlarging dark brown patch on his left upper shoulder over the past year and he sought care with dermatology.  He also had a left forehead BCC excised. \par \par Left upper shoulder shave biopsy 11/8/19- MELANOMA, approximately 0.4 mm in thickness, with focal dermal regression, mitotic index is 0/mm2, no ulceration. Tumor stage: pT1a\par \par Mr. RAMIREZ reports a longstanding history of sun exposure without the use of sunblock. History of sunburns. He also has a concerning mole on his lower back which has not yet been biopsied. \par \par PMH notable for HTN, HLD, Kidney stones resulting in obstruction and kidney failure s/p lithotripsy (last episode in 2014), IDDM, Depression/anxiety.  \par \par Family history includes mother with breast cancer, father with prostate cancer and brother with a melanoma. \par \par INTERVAL HISTORY:\par ***SURGERY 12/16/19-  S/p WLE of left upper back T1 melanoma and lower back atypical melanocytic nevus.  Closure by Dr. Parra. \par ***FINAL PATH-  1) Lower mid back- Dysplastic melanocytic nevus, compound type, with moderate to severe atypia, negative margins.   2) Left upper back - Invasive melanoma, superficial spreading type, Breslow thickness approximately 0.7 mm, with prominent regression changes, negative margins.  \par \par 1/2/2020-  Denies pain, fever or chills. Reports redness around the left upper back incision.  He states he was discharged home on Bactrim however developed a pruritic rash on his back and was told to stop the antibiotic.  The rash has since resolved.  \par \par His Nephrologist ordered a PET/CT(whole body) 9/24/21: Mild activity corresponding to skin thickening in the posterior upper back at the level of cervical and upper thoracic spine and increased activity  at subcutaneous infiltration in the right anterolateral abdomen could be inflammatory in nature. However in light of patient's clinical history, correlate on dermatologic exam to exclude underlying pathology there. \par Otherwise no evidence of FDG avid malignant disease elsewhere in the field of view \par \par 11/4/21- He underwent a full body skin check with Dr. Suresh with no abnormal findings and no biopsies . He denies any concerning skin lesions or masses. No bleeding or pruritic moles. States his kidney disease is worsening however he is not yet on dialysis. States his nephrologist discontinued some blood pressure medications. His BP today was 196/112;  Repeat BP after 5 minutes was 167/91.  He will call his nephrologist and PCP to discuss today's BP. \par \par 5/5/2022-  Pt. was seen by Dr. Suresh in 2/2022 with no new biopsies. Pt. states he is followed by a nephrologist and his kidney disease has been under control.  He is not on dialysis. Feeling well with no complaints. \par \par 10/6/22- Denies any concerning lesions or masses. Denies bleeding or pruritic moles. Denies pain or constitutional changes. He has not seen Dr. Suresh in a while and will make a follow up appointment. Kidney disease is under control and currently not on dialysis.

## 2022-12-13 NOTE — CONSULT LETTER
[Dear  ___] : Dear  [unfilled], [Consult Letter:] : I had the pleasure of evaluating your patient, [unfilled]. [Please see my note below.] : Please see my note below. [Consult Closing:] : Thank you very much for allowing me to participate in the care of this patient.  If you have any questions, please do not hesitate to contact me. [Sincerely,] : Sincerely, [FreeTextEntry3] : Aidan Vera MD, MPH, FACS\par Surgical Oncology\par [facility]\par Associate Professor of Surgery\par Department of General Surgery\par [employer] at [facility]

## 2023-01-18 NOTE — PHYSICAL EXAM
[Normal] : supple, no neck mass and thyroid not enlarged [Normal Neck Lymph Nodes] : normal neck lymph nodes  [Normal Supraclavicular Lymph Nodes] : normal supraclavicular lymph nodes [Normal Axillary Lymph Nodes] : normal axillary lymph nodes [Normal] : oriented to person, place and time, with appropriate affect [de-identified] : healed lower back incision with no evidence of recurrence;  healed left upper back incision with no evidence of recurrence within normal limits

## 2023-05-18 ENCOUNTER — OFFICE (OUTPATIENT)
Dept: URBAN - METROPOLITAN AREA CLINIC 1 | Facility: CLINIC | Age: 63
Setting detail: OPHTHALMOLOGY
End: 2023-05-18
Payer: COMMERCIAL

## 2023-05-18 DIAGNOSIS — H01.002: ICD-10-CM

## 2023-05-18 DIAGNOSIS — E11.3493: ICD-10-CM

## 2023-05-18 DIAGNOSIS — H01.005: ICD-10-CM

## 2023-05-18 DIAGNOSIS — H25.13: ICD-10-CM

## 2023-05-18 DIAGNOSIS — H01.001: ICD-10-CM

## 2023-05-18 DIAGNOSIS — H01.004: ICD-10-CM

## 2023-05-18 PROCEDURE — 92014 COMPRE OPH EXAM EST PT 1/>: CPT | Performed by: OPHTHALMOLOGY

## 2023-05-18 PROCEDURE — 92250 FUNDUS PHOTOGRAPHY W/I&R: CPT | Performed by: OPHTHALMOLOGY

## 2023-05-18 ASSESSMENT — CONFRONTATIONAL VISUAL FIELD TEST (CVF)
OS_FINDINGS: FULL
OD_FINDINGS: FULL

## 2023-05-18 ASSESSMENT — REFRACTION_CURRENTRX
OS_OVR_VA: 20/
OD_OVR_VA: 20/
OD_SPHERE: +2.50
OD_VPRISM_DIRECTION: SV
OS_SPHERE: +2.50
OS_VPRISM_DIRECTION: SV

## 2023-05-18 ASSESSMENT — REFRACTION_AUTOREFRACTION
OS_AXIS: 180
OS_CYLINDER: -0.50
OD_SPHERE: -1.25
OS_SPHERE: PLANO
OD_AXIS: 15
OD_CYLINDER: -1.50

## 2023-05-18 ASSESSMENT — LID EXAM ASSESSMENTS
OD_BLEPHARITIS: RLL RUL
OS_BLEPHARITIS: LLL LUL

## 2023-05-18 ASSESSMENT — REFRACTION_MANIFEST
OS_ADD: +2.25
OD_VA1: 20/30-1
OD_CYLINDER: -0.75
OD_ADD: +2.25
OS_AXIS: 180
OS_SPHERE: PLANO
OS_VA1: 20/30-1
OS_CYLINDER: -0.50
OD_SPHERE: -1.50
OD_AXIS: 15

## 2023-05-18 ASSESSMENT — KERATOMETRY
OD_K2POWER_DIOPTERS: 44.50
OS_K2POWER_DIOPTERS: 44.25
OD_AXISANGLE_DEGREES: 106
OD_K1POWER_DIOPTERS: 42.75
OS_K1POWER_DIOPTERS: 43.00
OS_AXISANGLE_DEGREES: 70

## 2023-05-18 ASSESSMENT — AXIALLENGTH_DERIVED
OD_AL: 24.3487
OD_AL: 24.297

## 2023-05-18 ASSESSMENT — TONOMETRY
OS_IOP_MMHG: 20
OD_IOP_MMHG: 19

## 2023-05-18 ASSESSMENT — VISUAL ACUITY
OD_BCVA: 20/30-2
OS_BCVA: 20/50-1

## 2023-05-18 ASSESSMENT — SPHEQUIV_DERIVED
OD_SPHEQUIV: -2
OD_SPHEQUIV: -1.875

## 2023-10-03 ENCOUNTER — APPOINTMENT (OUTPATIENT)
Dept: DERMATOLOGY | Facility: CLINIC | Age: 63
End: 2023-10-03
Payer: COMMERCIAL

## 2023-10-03 PROCEDURE — 99214 OFFICE O/P EST MOD 30 MIN: CPT

## 2023-11-08 PROBLEM — Z08 FOLLOW-UP SURVEILLANCE OF MELANOMA, ENCOUNTER FOR: Status: ACTIVE | Noted: 2023-11-08

## 2023-11-08 PROBLEM — C43.62: Status: ACTIVE | Noted: 2019-11-21

## 2023-11-09 ENCOUNTER — APPOINTMENT (OUTPATIENT)
Dept: SURGICAL ONCOLOGY | Facility: CLINIC | Age: 63
End: 2023-11-09
Payer: COMMERCIAL

## 2023-11-09 VITALS
HEART RATE: 85 BPM | HEIGHT: 67 IN | TEMPERATURE: 97.7 F | OXYGEN SATURATION: 89 % | SYSTOLIC BLOOD PRESSURE: 175 MMHG | WEIGHT: 248.13 LBS | BODY MASS INDEX: 38.94 KG/M2 | DIASTOLIC BLOOD PRESSURE: 91 MMHG

## 2023-11-09 DIAGNOSIS — Z85.820 ENCOUNTER FOR FOLLOW-UP EXAMINATION AFTER COMPLETED TREATMENT FOR MALIGNANT NEOPLASM: ICD-10-CM

## 2023-11-09 DIAGNOSIS — C43.62 MALIGNANT MELANOMA OF LEFT UPPER LIMB, INCLUDING SHOULDER: ICD-10-CM

## 2023-11-09 DIAGNOSIS — Z08 ENCOUNTER FOR FOLLOW-UP EXAMINATION AFTER COMPLETED TREATMENT FOR MALIGNANT NEOPLASM: ICD-10-CM

## 2023-11-09 PROCEDURE — 99214 OFFICE O/P EST MOD 30 MIN: CPT

## 2023-11-22 ENCOUNTER — OFFICE (OUTPATIENT)
Dept: URBAN - METROPOLITAN AREA CLINIC 1 | Facility: CLINIC | Age: 63
Setting detail: OPHTHALMOLOGY
End: 2023-11-22
Payer: COMMERCIAL

## 2023-11-22 DIAGNOSIS — H01.005: ICD-10-CM

## 2023-11-22 DIAGNOSIS — H01.001: ICD-10-CM

## 2023-11-22 DIAGNOSIS — H01.004: ICD-10-CM

## 2023-11-22 DIAGNOSIS — H25.13: ICD-10-CM

## 2023-11-22 DIAGNOSIS — H01.002: ICD-10-CM

## 2023-11-22 DIAGNOSIS — E11.3413: ICD-10-CM

## 2023-11-22 PROCEDURE — 99214 OFFICE O/P EST MOD 30 MIN: CPT | Performed by: OPHTHALMOLOGY

## 2023-11-22 PROCEDURE — 92134 CPTRZ OPH DX IMG PST SGM RTA: CPT | Performed by: OPHTHALMOLOGY

## 2023-11-22 ASSESSMENT — REFRACTION_MANIFEST
OS_AXIS: 180
OS_VA1: 20/30-1
OS_ADD: +2.25
OD_ADD: +2.25
OS_SPHERE: PLANO
OS_CYLINDER: -0.50
OD_CYLINDER: -0.75
OD_AXIS: 15
OD_VA1: 20/30-1
OD_SPHERE: -1.50

## 2023-11-22 ASSESSMENT — LID EXAM ASSESSMENTS
OD_BLEPHARITIS: RLL RUL
OS_BLEPHARITIS: LLL LUL

## 2023-11-22 ASSESSMENT — REFRACTION_AUTOREFRACTION
OS_SPHERE: +0.50
OD_SPHERE: -0.25
OS_AXIS: 021
OD_CYLINDER: -0.25
OS_CYLINDER: -0.25
OD_AXIS: 018

## 2023-11-22 ASSESSMENT — REFRACTION_CURRENTRX
OS_SPHERE: +2.50
OS_VPRISM_DIRECTION: SV
OD_VPRISM_DIRECTION: SV
OD_SPHERE: +2.50
OS_OVR_VA: 20/
OD_OVR_VA: 20/

## 2023-11-22 ASSESSMENT — SPHEQUIV_DERIVED
OD_SPHEQUIV: -1.875
OS_SPHEQUIV: 0.375
OD_SPHEQUIV: -0.375

## 2023-11-22 ASSESSMENT — CONFRONTATIONAL VISUAL FIELD TEST (CVF)
OD_FINDINGS: FULL
OS_FINDINGS: FULL

## 2023-12-05 ENCOUNTER — OFFICE (OUTPATIENT)
Dept: URBAN - METROPOLITAN AREA CLINIC 88 | Facility: CLINIC | Age: 63
Setting detail: OPHTHALMOLOGY
End: 2023-12-05
Payer: MEDICARE

## 2023-12-05 DIAGNOSIS — E11.3413: ICD-10-CM

## 2023-12-05 PROCEDURE — 92134 CPTRZ OPH DX IMG PST SGM RTA: CPT | Performed by: OPHTHALMOLOGY

## 2023-12-05 PROCEDURE — 67028 INJECTION EYE DRUG: CPT | Mod: 50 | Performed by: OPHTHALMOLOGY

## 2023-12-05 PROCEDURE — 92235 FLUORESCEIN ANGRPH MLTIFRAME: CPT | Performed by: OPHTHALMOLOGY

## 2023-12-05 ASSESSMENT — SPHEQUIV_DERIVED
OD_SPHEQUIV: -0.375
OS_SPHEQUIV: 0.375

## 2023-12-05 ASSESSMENT — REFRACTION_AUTOREFRACTION
OS_CYLINDER: -0.25
OS_AXIS: 021
OS_SPHERE: +0.50
OD_SPHERE: -0.25
OD_AXIS: 018
OD_CYLINDER: -0.25

## 2023-12-05 ASSESSMENT — CONFRONTATIONAL VISUAL FIELD TEST (CVF)
OS_FINDINGS: FULL
OD_FINDINGS: FULL

## 2023-12-05 ASSESSMENT — LID EXAM ASSESSMENTS
OS_BLEPHARITIS: LLL LUL
OD_BLEPHARITIS: RLL RUL

## 2024-01-03 ENCOUNTER — OFFICE (OUTPATIENT)
Dept: URBAN - METROPOLITAN AREA CLINIC 88 | Facility: CLINIC | Age: 64
Setting detail: OPHTHALMOLOGY
End: 2024-01-03
Payer: MEDICARE

## 2024-01-03 DIAGNOSIS — E11.3413: ICD-10-CM

## 2024-01-03 PROCEDURE — 92134 CPTRZ OPH DX IMG PST SGM RTA: CPT | Performed by: OPHTHALMOLOGY

## 2024-01-03 PROCEDURE — 67028 INJECTION EYE DRUG: CPT | Mod: 50 | Performed by: OPHTHALMOLOGY

## 2024-01-03 ASSESSMENT — REFRACTION_AUTOREFRACTION
OS_AXIS: 021
OD_SPHERE: -0.25
OS_CYLINDER: -0.25
OD_AXIS: 018
OS_SPHERE: +0.50
OD_CYLINDER: -0.25

## 2024-01-03 ASSESSMENT — SPHEQUIV_DERIVED
OD_SPHEQUIV: -0.375
OS_SPHEQUIV: 0.375

## 2024-01-03 ASSESSMENT — LID EXAM ASSESSMENTS
OS_BLEPHARITIS: LLL LUL
OD_BLEPHARITIS: RLL RUL

## 2024-01-03 ASSESSMENT — CONFRONTATIONAL VISUAL FIELD TEST (CVF)
OS_FINDINGS: FULL
OD_FINDINGS: FULL

## 2024-02-20 ENCOUNTER — OFFICE (OUTPATIENT)
Dept: URBAN - METROPOLITAN AREA CLINIC 88 | Facility: CLINIC | Age: 64
Setting detail: OPHTHALMOLOGY
End: 2024-02-20
Payer: MEDICARE

## 2024-02-20 DIAGNOSIS — E11.3413: ICD-10-CM

## 2024-02-20 PROCEDURE — 67028 INJECTION EYE DRUG: CPT | Mod: 50 | Performed by: OPHTHALMOLOGY

## 2024-02-20 PROCEDURE — 92134 CPTRZ OPH DX IMG PST SGM RTA: CPT | Performed by: OPHTHALMOLOGY

## 2024-02-20 ASSESSMENT — LID EXAM ASSESSMENTS
OD_BLEPHARITIS: RLL RUL
OS_BLEPHARITIS: LLL LUL

## 2024-02-20 ASSESSMENT — CONFRONTATIONAL VISUAL FIELD TEST (CVF)
OD_FINDINGS: FULL
OS_FINDINGS: FULL

## 2024-02-20 ASSESSMENT — SPHEQUIV_DERIVED
OD_SPHEQUIV: -0.375
OS_SPHEQUIV: 0.375

## 2024-02-20 ASSESSMENT — REFRACTION_AUTOREFRACTION
OD_SPHERE: -0.25
OD_CYLINDER: -0.25
OD_AXIS: 018
OS_CYLINDER: -0.25
OS_AXIS: 021
OS_SPHERE: +0.50

## 2024-05-13 ENCOUNTER — APPOINTMENT (OUTPATIENT)
Dept: DERMATOLOGY | Facility: CLINIC | Age: 64
End: 2024-05-13

## 2024-07-02 ENCOUNTER — OFFICE (OUTPATIENT)
Dept: URBAN - METROPOLITAN AREA CLINIC 88 | Facility: CLINIC | Age: 64
Setting detail: OPHTHALMOLOGY
End: 2024-07-02
Payer: MEDICARE

## 2024-07-02 DIAGNOSIS — E11.3413: ICD-10-CM

## 2024-07-02 PROCEDURE — 92012 INTRM OPH EXAM EST PATIENT: CPT | Performed by: OPHTHALMOLOGY

## 2024-07-02 PROCEDURE — 92134 CPTRZ OPH DX IMG PST SGM RTA: CPT | Performed by: OPHTHALMOLOGY

## 2024-07-02 ASSESSMENT — LID EXAM ASSESSMENTS
OD_BLEPHARITIS: RLL RUL
OS_BLEPHARITIS: LLL LUL

## 2024-09-04 ENCOUNTER — OFFICE (OUTPATIENT)
Dept: URBAN - METROPOLITAN AREA CLINIC 88 | Facility: CLINIC | Age: 64
Setting detail: OPHTHALMOLOGY
End: 2024-09-04
Payer: MEDICARE

## 2024-09-04 DIAGNOSIS — E11.3413: ICD-10-CM

## 2024-09-04 PROCEDURE — 92134 CPTRZ OPH DX IMG PST SGM RTA: CPT | Performed by: OPHTHALMOLOGY

## 2024-09-04 PROCEDURE — 92012 INTRM OPH EXAM EST PATIENT: CPT | Performed by: OPHTHALMOLOGY

## 2024-09-04 PROCEDURE — 92235 FLUORESCEIN ANGRPH MLTIFRAME: CPT | Performed by: OPHTHALMOLOGY

## 2024-09-04 ASSESSMENT — CONFRONTATIONAL VISUAL FIELD TEST (CVF)
OS_FINDINGS: FULL
OD_FINDINGS: FULL

## 2024-09-04 ASSESSMENT — LID EXAM ASSESSMENTS
OD_BLEPHARITIS: RLL RUL
OS_BLEPHARITIS: LLL LUL

## 2024-12-12 ENCOUNTER — APPOINTMENT (OUTPATIENT)
Dept: SURGICAL ONCOLOGY | Facility: CLINIC | Age: 64
End: 2024-12-12

## 2025-03-05 ENCOUNTER — OFFICE (OUTPATIENT)
Dept: URBAN - METROPOLITAN AREA CLINIC 88 | Facility: CLINIC | Age: 65
Setting detail: OPHTHALMOLOGY
End: 2025-03-05
Payer: MEDICARE

## 2025-03-05 DIAGNOSIS — E11.3413: ICD-10-CM

## 2025-03-05 DIAGNOSIS — H25.13: ICD-10-CM

## 2025-03-05 DIAGNOSIS — E11.3411: ICD-10-CM

## 2025-03-05 PROCEDURE — 67028 INJECTION EYE DRUG: CPT | Mod: RT | Performed by: OPHTHALMOLOGY

## 2025-03-05 PROCEDURE — 92014 COMPRE OPH EXAM EST PT 1/>: CPT | Mod: 25 | Performed by: OPHTHALMOLOGY

## 2025-03-05 PROCEDURE — 92134 CPTRZ OPH DX IMG PST SGM RTA: CPT | Performed by: OPHTHALMOLOGY

## 2025-03-05 ASSESSMENT — LID EXAM ASSESSMENTS
OD_BLEPHARITIS: RLL RUL
OS_BLEPHARITIS: LLL LUL

## 2025-03-05 ASSESSMENT — KERATOMETRY
OS_AXISANGLE_DEGREES: 059
OS_K1POWER_DIOPTERS: 42.75
OD_K2POWER_DIOPTERS: 44.50
OD_AXISANGLE_DEGREES: 107
OD_K1POWER_DIOPTERS: 42.75
OS_K2POWER_DIOPTERS: 43.50

## 2025-03-05 ASSESSMENT — TONOMETRY
OD_IOP_MMHG: 18
OS_IOP_MMHG: 18

## 2025-03-05 ASSESSMENT — REFRACTION_AUTOREFRACTION
OS_CYLINDER: -0.25
OS_AXIS: 021
OD_SPHERE: -0.25
OD_AXIS: 018
OD_CYLINDER: -0.25
OS_SPHERE: +0.50

## 2025-03-05 ASSESSMENT — CONFRONTATIONAL VISUAL FIELD TEST (CVF)
OS_FINDINGS: FULL
OD_FINDINGS: FULL

## 2025-03-05 ASSESSMENT — VISUAL ACUITY
OD_BCVA: 20/40+
OS_BCVA: 20/30

## 2025-04-02 ENCOUNTER — OFFICE (OUTPATIENT)
Dept: URBAN - METROPOLITAN AREA CLINIC 88 | Facility: CLINIC | Age: 65
Setting detail: OPHTHALMOLOGY
End: 2025-04-02
Payer: MEDICARE

## 2025-04-02 DIAGNOSIS — E11.3413: ICD-10-CM

## 2025-04-02 DIAGNOSIS — H25.13: ICD-10-CM

## 2025-04-02 PROCEDURE — 92012 INTRM OPH EXAM EST PATIENT: CPT | Performed by: OPHTHALMOLOGY

## 2025-04-02 PROCEDURE — 92134 CPTRZ OPH DX IMG PST SGM RTA: CPT | Performed by: OPHTHALMOLOGY

## 2025-04-02 ASSESSMENT — REFRACTION_AUTOREFRACTION
OD_AXIS: 018
OD_CYLINDER: -0.25
OS_AXIS: 021
OS_CYLINDER: -0.25
OS_SPHERE: +0.50
OD_SPHERE: -0.25

## 2025-04-02 ASSESSMENT — LID EXAM ASSESSMENTS
OS_BLEPHARITIS: LLL LUL
OD_BLEPHARITIS: RLL RUL

## 2025-04-02 ASSESSMENT — TONOMETRY
OS_IOP_MMHG: 12
OD_IOP_MMHG: 17

## 2025-04-02 ASSESSMENT — KERATOMETRY
OS_AXISANGLE_DEGREES: 059
OD_AXISANGLE_DEGREES: 107
OS_K1POWER_DIOPTERS: 42.75
OD_K1POWER_DIOPTERS: 42.75
OS_K2POWER_DIOPTERS: 43.50
OD_K2POWER_DIOPTERS: 44.50

## 2025-04-02 ASSESSMENT — VISUAL ACUITY
OD_BCVA: 20/30
OS_BCVA: 20/25

## 2025-04-02 ASSESSMENT — CONFRONTATIONAL VISUAL FIELD TEST (CVF)
OD_FINDINGS: FULL
OS_FINDINGS: FULL

## 2025-06-04 ENCOUNTER — OFFICE (OUTPATIENT)
Dept: URBAN - METROPOLITAN AREA CLINIC 88 | Facility: CLINIC | Age: 65
Setting detail: OPHTHALMOLOGY
End: 2025-06-04
Payer: MEDICARE

## 2025-06-04 DIAGNOSIS — E11.3413: ICD-10-CM

## 2025-06-04 PROCEDURE — 92012 INTRM OPH EXAM EST PATIENT: CPT | Performed by: OPHTHALMOLOGY

## 2025-06-04 PROCEDURE — 92134 CPTRZ OPH DX IMG PST SGM RTA: CPT | Performed by: OPHTHALMOLOGY

## 2025-06-04 ASSESSMENT — LID EXAM ASSESSMENTS
OS_BLEPHARITIS: LLL LUL
OD_BLEPHARITIS: RLL RUL

## 2025-06-04 ASSESSMENT — CONFRONTATIONAL VISUAL FIELD TEST (CVF)
OD_FINDINGS: FULL
OS_FINDINGS: FULL

## 2025-06-04 ASSESSMENT — TONOMETRY
OS_IOP_MMHG: 16
OD_IOP_MMHG: 20

## 2025-06-05 ASSESSMENT — VISUAL ACUITY
OD_BCVA: 20/25
OS_BCVA: 20/20

## 2025-06-05 ASSESSMENT — REFRACTION_AUTOREFRACTION
OS_CYLINDER: -0.25
OD_CYLINDER: -0.25
OD_SPHERE: -0.25
OS_AXIS: 021
OD_AXIS: 018
OS_SPHERE: +0.50

## 2025-06-05 ASSESSMENT — KERATOMETRY
OS_K2POWER_DIOPTERS: 43.50
OS_AXISANGLE_DEGREES: 059
OD_K2POWER_DIOPTERS: 44.50
OD_AXISANGLE_DEGREES: 107
OD_K1POWER_DIOPTERS: 42.75
OS_K1POWER_DIOPTERS: 42.75

## 2025-08-02 ENCOUNTER — NON-APPOINTMENT (OUTPATIENT)
Age: 65
End: 2025-08-02

## 2025-08-04 ENCOUNTER — RESULT REVIEW (OUTPATIENT)
Age: 65
End: 2025-08-04

## 2025-08-04 ENCOUNTER — APPOINTMENT (OUTPATIENT)
Dept: DERMATOLOGY | Facility: CLINIC | Age: 65
End: 2025-08-04
Payer: COMMERCIAL

## 2025-08-04 PROCEDURE — 11102 TANGNTL BX SKIN SINGLE LES: CPT

## 2025-08-04 PROCEDURE — 99213 OFFICE O/P EST LOW 20 MIN: CPT | Mod: 25

## 2025-09-18 ENCOUNTER — NON-APPOINTMENT (OUTPATIENT)
Age: 65
End: 2025-09-18

## 2025-09-18 ENCOUNTER — APPOINTMENT (OUTPATIENT)
Dept: DERMATOLOGY | Facility: CLINIC | Age: 65
End: 2025-09-18
Payer: COMMERCIAL

## 2025-09-18 DIAGNOSIS — C44.41 BASAL CELL CARCINOMA OF SKIN OF SCALP AND NECK: ICD-10-CM

## 2025-09-18 PROCEDURE — 12001 RPR S/N/AX/GEN/TRNK 2.5CM/<: CPT

## 2025-09-18 PROCEDURE — 17311 MOHS 1 STAGE H/N/HF/G: CPT
